# Patient Record
Sex: FEMALE | Race: BLACK OR AFRICAN AMERICAN | NOT HISPANIC OR LATINO
[De-identification: names, ages, dates, MRNs, and addresses within clinical notes are randomized per-mention and may not be internally consistent; named-entity substitution may affect disease eponyms.]

---

## 2017-08-27 ENCOUNTER — HOSPITAL (OUTPATIENT)
Dept: OTHER | Age: 22
End: 2017-08-27
Attending: EMERGENCY MEDICINE

## 2017-10-01 ENCOUNTER — HEALTH MAINTENANCE LETTER (OUTPATIENT)
Age: 22
End: 2017-10-01

## 2017-10-12 ENCOUNTER — HOSPITAL (OUTPATIENT)
Dept: OTHER | Age: 22
End: 2017-10-12
Attending: EMERGENCY MEDICINE

## 2017-10-12 LAB
ALBUMIN SERPL-MCNC: 3.7 GM/DL (ref 3.6–5.1)
ALP SERPL-CCNC: 79 UNIT/L (ref 45–117)
ALT SERPL-CCNC: 23 UNIT/L
ANALYZER ANC (IANC): NORMAL
ANION GAP SERPL CALC-SCNC: 16 MMOL/L (ref 10–20)
AST SERPL-CCNC: 21 UNIT/L
BASOPHILS # BLD: 0 THOUSAND/MCL (ref 0–0.3)
BASOPHILS NFR BLD: 1 %
BILIRUB CONJ SERPL-MCNC: 0.1 MG/DL (ref 0–0.2)
BILIRUB SERPL-MCNC: 0.7 MG/DL (ref 0.2–1)
BUN SERPL-MCNC: 11 MG/DL (ref 6–20)
BUN/CREAT SERPL: 13 (ref 7–25)
CALCIUM SERPL-MCNC: 9.4 MG/DL (ref 8.4–10.2)
CHLORIDE: 105 MMOL/L (ref 98–107)
CO2 SERPL-SCNC: 25 MMOL/L (ref 21–32)
CREAT SERPL-MCNC: 0.88 MG/DL (ref 0.51–0.95)
DIFFERENTIAL METHOD BLD: NORMAL
EOSINOPHIL # BLD: 0.3 THOUSAND/MCL (ref 0.1–0.5)
EOSINOPHIL NFR BLD: 6 %
ERYTHROCYTE [DISTWIDTH] IN BLOOD: 11.9 % (ref 11–15)
GLUCOSE SERPL-MCNC: 115 MG/DL (ref 65–99)
HCG SERPL QL: NEGATIVE
HEMATOCRIT: 40.6 % (ref 36–46.5)
HGB BLD-MCNC: 14.3 GM/DL (ref 12–15.5)
LIPASE SERPL-CCNC: 148 UNIT/L (ref 73–393)
LYMPHOCYTES # BLD: 1.4 THOUSAND/MCL (ref 1–4.8)
LYMPHOCYTES NFR BLD: 23 %
MCH RBC QN AUTO: 31.6 PG (ref 26–34)
MCHC RBC AUTO-ENTMCNC: 35.2 GM/DL (ref 32–36.5)
MCV RBC AUTO: 89.8 FL (ref 78–100)
MONOCYTES # BLD: 0.7 THOUSAND/MCL (ref 0.3–0.9)
MONOCYTES NFR BLD: 11 %
NEUTROPHILS # BLD: 3.7 THOUSAND/MCL (ref 1.8–7.7)
NEUTROPHILS NFR BLD: 59 %
NEUTS SEG NFR BLD: NORMAL %
PERCENT NRBC: NORMAL
PLATELET # BLD: 224 THOUSAND/MCL (ref 140–450)
POTASSIUM SERPL-SCNC: 3.6 MMOL/L (ref 3.4–5.1)
PROT SERPL-MCNC: 7.7 GM/DL (ref 6.4–8.2)
RBC # BLD: 4.52 MILLION/MCL (ref 4–5.2)
SODIUM SERPL-SCNC: 142 MMOL/L (ref 135–145)
WBC # BLD: 6.2 THOUSAND/MCL (ref 4.2–11)

## 2021-01-03 ENCOUNTER — TELEPHONE (OUTPATIENT)
Dept: BEHAVIORAL HEALTH | Facility: CLINIC | Age: 26
End: 2021-01-03

## 2021-01-03 ENCOUNTER — COMMUNICATION - HEALTHEAST (OUTPATIENT)
Dept: CARE COORDINATION | Facility: HOSPITAL | Age: 26
End: 2021-01-03

## 2021-01-03 ENCOUNTER — HOSPITAL ENCOUNTER (EMERGENCY)
Facility: CLINIC | Age: 26
Discharge: NURSING, REHAB OR LONG-TERM CARE | End: 2021-01-03
Attending: EMERGENCY MEDICINE | Admitting: EMERGENCY MEDICINE

## 2021-01-03 VITALS
BODY MASS INDEX: 45.36 KG/M2 | TEMPERATURE: 97.4 F | DIASTOLIC BLOOD PRESSURE: 76 MMHG | HEART RATE: 69 BPM | SYSTOLIC BLOOD PRESSURE: 125 MMHG | HEIGHT: 67 IN | WEIGHT: 289 LBS | OXYGEN SATURATION: 99 % | RESPIRATION RATE: 14 BRPM

## 2021-01-03 DIAGNOSIS — F32.A DEPRESSION, UNSPECIFIED DEPRESSION TYPE: ICD-10-CM

## 2021-01-03 DIAGNOSIS — R45.851 SUICIDAL THOUGHTS: ICD-10-CM

## 2021-01-03 LAB
AMPHETAMINES UR QL SCN: NEGATIVE
BARBITURATES UR QL: NEGATIVE
BENZODIAZ UR QL: NEGATIVE
CANNABINOIDS UR QL SCN: POSITIVE
COCAINE UR QL: NEGATIVE
HCG UR QL: NEGATIVE
LABORATORY COMMENT REPORT: NORMAL
OPIATES UR QL SCN: NEGATIVE
PCP UR QL SCN: NEGATIVE
SARS-COV-2 RNA SPEC QL NAA+PROBE: NEGATIVE
SPECIMEN SOURCE: NORMAL

## 2021-01-03 PROCEDURE — C9803 HOPD COVID-19 SPEC COLLECT: HCPCS

## 2021-01-03 PROCEDURE — 81025 URINE PREGNANCY TEST: CPT | Performed by: EMERGENCY MEDICINE

## 2021-01-03 PROCEDURE — 80307 DRUG TEST PRSMV CHEM ANLYZR: CPT | Performed by: EMERGENCY MEDICINE

## 2021-01-03 PROCEDURE — 90791 PSYCH DIAGNOSTIC EVALUATION: CPT

## 2021-01-03 PROCEDURE — 87635 SARS-COV-2 COVID-19 AMP PRB: CPT | Performed by: EMERGENCY MEDICINE

## 2021-01-03 PROCEDURE — 99285 EMERGENCY DEPT VISIT HI MDM: CPT | Mod: 25

## 2021-01-03 ASSESSMENT — ENCOUNTER SYMPTOMS
NERVOUS/ANXIOUS: 1
COUGH: 0
CHILLS: 0
FEVER: 0

## 2021-01-03 ASSESSMENT — MIFFLIN-ST. JEOR: SCORE: 2088.53
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
## 2021-01-03 NOTE — ED PROVIDER NOTES
"  History   Chief Complaint:  Suicidal     The history is provided by the patient.      Lidya Macedo is a 25 year old female who presents with suicidal ideations. The patients tells us that she cut herself this evening and is feeling helpless. She notes that this is not the first time she has tried to harm herself, last year she took a bunch of pills in an attempt to kill herself and she didn't want to get to that point which prompted her ED visit. She tells us that she has never had professional help before and that her mother and sister are the ones who help her through situations like this. She also tells us that she is currently sober and that she does not have any issues with drugs or alcohol. Patient denies cough, cold fever or chills.     Review of Systems   Constitutional: Negative for chills and fever.   Respiratory: Negative for cough.    Psychiatric/Behavioral: Positive for self-injury and suicidal ideas. The patient is nervous/anxious.    All other systems reviewed and are negative.    Allergies:  The patient has no known allergies.     Medications:  The patient is not currently taking any prescribed medications.    Past Medical History:     The patient is unable to provide past medical history secondary to current state.      Past Surgical History:    The patient denies past surgical history.      Family History:    The patient denies past family history.     Social History:  The patient has a mom and sister who help her work through things in life.  The patient has not seen a therapist about any of her struggles.     Physical Exam     Patient Vitals for the past 24 hrs:   BP Temp Temp src Pulse Resp SpO2 Height Weight   01/03/21 0334 -- -- -- -- -- 99 % -- --   01/03/21 0316 (!) 145/89 97.4  F (36.3  C) Temporal 110 18 -- 1.702 m (5' 7\") 131.1 kg (289 lb)       Physical Exam    Constitutional:  Cooperative. Shaking. Tearful. Looks sad and uncomfortable.   HENT:   Head:    Atraumatic. "   Mouth/Throat:   Oropharynx is without erythema or exudate and mucous membranes are moist.   Eyes:    Conjunctivae normal and EOM are normal.      Pupils are equal, round, and reactive to light.   Neck:    Normal range of motion. Neck supple.   Cardiovascular:  Normal rate, regular rhythm, normal heart sounds and radial and dorsalis pedis pulses are 2+ and symmetric.    Pulmonary/Chest:  Effort normal and breath sounds normal.   Abdominal:   Soft. Bowel sounds are normal.      No splenomegaly or hepatomegaly. No tenderness. No rebound.   Musculoskeletal:  Normal range of motion. No edema and no tenderness.   Neurological:  Alert. Normal strength. No cranial nerve deficit.  Skin:    Skin is warm and dry.   Psychiatric:   Crying, depressed mood. Flat affect. Limited eye contact. Normal speech, well groomed.    Emergency Department Course     Emergency Department Course:    Reviewed:  I reviewed nursing notes, vitals, past medical history and care everywhere    Assessments:  0315 I performed my initial examination on the patient at this time, see examination note above.     0710 I rechecked the patient. Explained findings to patient.    Consults:  0540 DEC spoke with the patient.     0720 I contacted DEC, who I updated on the findings.    Disposition:  Boarding.     Impression & Plan     Medical Decision Making:    Patient presents reporting situational stressors and worsening depression.  She has been having suicidal thoughts for the last few days.  Tonight she made several superficial cuts on her right arm.  She reported a past suicide attempt by overdose, and did not want to get to that point again.  She denies having ingested anything to try to harm herself.  She reports she is sober.  She denies recent illness or injury.    On exam she is tearful, with depressed mood and flat affect.  She does not make good eye contact.  The superficial abrasions on her right forearm do not require repair.    She met with the  mental health , and discussed options for inpatient versus outpatient therapy.  Initially the patient reported she wanted outpatient treatment, but when I went to discuss discharge with the patient, she is unable to contract for safety.  She remains tearful and distraught.  She has me to inform her boyfriend that she was in the hospital, and I called him.    An inpatient mental health bed is being arranged.  Because there is a COVID-19 pandemic in a asymptomatic Covid swab will be obtained.  Drug screen and pregnancy test are also pending.  I signed her out to my partner at 7:40 AM, awaiting mental health bed.    Patient is agreeable to this plan.    Diagnosis:    ICD-10-CM    1. Depression, unspecified depression type  F32.9    2. Suicidal thoughts  R45.851    3. Self-inflicted injury  Z72.89          Scribe Disclosure:  IKalen, am serving as a scribe at 3:15 AM on 1/3/2021 to document services personally performed by Prudencio Ma MD, based on my observations and the provider's statements to me.     Scribe Disclosure:  I, London Meyer, am serving as a scribe at 7:28 AM on 1/3/2021 to document services personally performed by Prudencio Ma MD based on my observations and the provider's statements to me.         Prudencio Ma MD  01/03/21 0741
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
## 2021-01-03 NOTE — ED NOTES
"Writer answered staff land line recognizing the phone number to be that of Lidya's boyfriend \"Leighton\" as given to writer in report from Caitlin HADDAD.  When writer answered the phone the caller asked to talk with Lidya.  Writer asked the caller's relationship and he stated \"Her brother\".  Writer knowing this to not be true writer asked the caller once again to clarify his relationship with Lidya and he again stated \"Her brother\"  At this point writer communicated to \"Leighton\" that Caitlin HADDAD had spoke with him earlier and at that time Leighton stated he is Lidya's boyfriend.  Leighton made no comment.  Writer stated that the  \"Eleanor\" here at [facility] had attempted to call Leighton and he did not answer the phone.  Leighton with a pressured voice made demands to \"talk with Lidya, Is she OK? I want to talk with her now\"  Writer redirected Leighton's conversation stating that the  would like to speak with him directly and attempted to give Leighton the  direct phone number.  Leighton interrupted writer was again making demands to speak with Lidya stating in a pressured tone of voice, \"this is a big fucked up deal\"  Writer again attempted to give Leighton the  direct line.  Leighton listened and then hung up the phone.     "

## 2021-01-03 NOTE — TELEPHONE ENCOUNTER
R: left vm for Oscar at 9:55 am asking for a call back. mbw  Author called #77 and left a msg asking for RN to call back. mbw  Author called #77 charge RN to ask about cohorting. He said they are unable to do so today. nikki  Author called Gerber at [facility] at 11:19 am and he said they are at cap currently but may have availability tomor. nikki  Author called Cleopatra at [facility] who requested a CMP, CBC and urine results if pt admits to [facility]. nikki  Author called [facility] at 11:31 am and left a vm asking for a call back. nikki  Author called [facility] ED and left a msg asking for a copy of the hold and asking for ED MD to call back. nikki  #4500/[facility] accepted for [facility] (lowest census) and agreed to accept pt as overflow on 4500 (pt not psychotic); unit informed at 11:40 am, er informed at 11:49 am; author faxed clinical and hold paperwork to 4500. nikki

## 2021-01-03 NOTE — TELEPHONE ENCOUNTER
S:  ARAM Duenas called at 7:48am with 25y/F in [facility] ED with SIB and SI.    B:  Pt presents tot he ED with self inflicted wounds from cutting, endorsing SI and unable to contract for safety.  Pt has hx of depression.  Pt has no insight, reports feeling overwhelmed and experiencing emotional distress that is contributing to current psychological angst.  Pt is isolative as she recently moved from [place] and is new to the [facility].   Pt has no social supports.  Pt has a past suicide attempt a year ago by overdose.  No AH or VH.  Apt denies HI.  Pt calm and cooperative.      A:  VOL    R: Patient cleared and ready for behavioral bed placement: Yes   Pt placed on worklist pending bed availability

## 2021-01-03 NOTE — ED NOTES
Provider at bedside, patient refusing to end phone conversation with her boyfriend to speak with physician.  Patient is requesting that physician speak with her boyfriend on the phone.  Florentinofrienross states that the patients children are ages 3 and 6 and currently unsupervised, however he refuses to give information in regards to the location of these children.   on call paged.      Florentinotania Garcial can be reached at the [facility]:  [phone]  Room 113

## 2021-01-03 NOTE — ED NOTES
"Pt standing in the doorway of her room rocking back and forth looking at the ground.  Pt stated \"I want to go home\"  Writer again clarified that pt will be transferred to [facility] and that he hold will be lifted by the psychiatrist in 72 hours with inpatient participation as well as outpatient plan.  Pt offered hot breakfast or lunch and she declined.  Pt sat on the edge of her bed looking down at the floor with shoulders slumped   "

## 2021-01-03 NOTE — ED NOTES
72 hour hold and copy of patient rights faxed too [facility] intake at their request.  Pt given a copy

## 2021-01-03 NOTE — ED NOTES
Pt resting, repositions independently.  Remains on a 72 hour hold with staff and security watch with SW in process of plan.

## 2021-01-03 NOTE — ED PROVIDER NOTES
"I was contacted by nursing as the patient requested to leave the emergency department.  The patient was previously here on voluntary status as she wanted admission and was unable to contract for safety when Dr. Ma spoke to her this morning.  Per nursing she had been on the phone with her boyfriend when she requested to leave.  While I was speaking to the patient she would not get off the phone with her boyfriend even when specifically asked she placed the phone in her lap.  When I discussed that we must be able to have a conversation privately without anyone on the phone.  I asked her to call me to reassess when she was able to get off the phone.  She called nursing in and reported that her boyfriend wanted to speak to me and provided his name and room number.  I spoke to him and he requested she be discharged because \"she needs to work to make money\" and then because \"her kids are home alone\"  I asked how old the children are and was told \"3 and 6.\"  I then verified that the children age 3 and 6 were home alone and he reported that they are.  I stated that we will have social work help us figure out care for the children while the patient is hospitalized and requested to address.  He did not provide me an address and stated that \"the children would only open the door for their mother\" and that he was \"not going to give me an address if I was not going to discharge her\"    He told me several times that I needed to discharge the patient when I informed him that she would be here at least until she is assessed by psychiatry and for potentially 3 to 4 days.    I immediately contacted  some concern for the safety of the children.  She is coming to the emergency department to talk to the patient.    MD Ofelia Wong Karah M, MD  01/03/21 1014    "

## 2021-01-03 NOTE — ED NOTES
Pt resting, repositions independently.  Every time pt repositions she keeps the blanket over her head.  Remains on a 72 hour hold with staff and security watch

## 2021-01-03 NOTE — ED NOTES
"Patient's boyfriend calling, asking to speak with the patient states is regarding \"The kid situation\".  Patient does not wish to speak with him at this time. Boyfriend continues to call and states \"can you wake her up, I don't want to leave the kids without her consent\".    "

## 2021-01-03 NOTE — ED AVS SNAPSHOT
[facility] Emergency Dept  [address] [address]  Phone: [phone]  Fax: [phone]                                    Lidya Macedo   MRN: [id number]    Department: [facility] Emergency Dept   Date of Visit: 1/3/2021           After Visit Summary Signature Page    I have received my discharge instructions, and my questions have been answered. I have discussed any challenges I see with this plan with the nurse or doctor.    ..........................................................................................................................................  Patient/Patient Representative Signature      ..........................................................................................................................................  Patient Representative Print Name and Relationship to Patient    ..................................................               ................................................  Date                                   Time    ..........................................................................................................................................  Reviewed by Signature/Title    ...................................................              ..............................................  Date                                               Time          22EPIC Rev 08/18

## 2021-01-03 NOTE — ED TRIAGE NOTES
Patient arrived in ED appearing upset, nervous, and tearful, stating that she is having thoughts of suicide and cutting herself.

## 2021-01-03 NOTE — SAFE
Lidya Macedo  January 3, 2021    Lidya is deemed to be at moderate risk for suicide. She has limited social supports and is isolated and new to the area. She currently does not have enough protective factors to engage to keep her safe. She has self inflicted wounds and is experiencing emotional distress that is contributing to current psychological angst. She is not able to return home due to low emotional tolerance and low coping skills that are effective and healthy.     Risk Factors: Suicidal ideation, relocation, social stressors, emotional distress, untreated mental health, limited social supports, lack of follow through, hesitancy towards compliancy, lack of insight.     Protective Factors: States would be willing to meet with therapist and psychiatrist in order to change and improve.     Psychotherapy Intervention: Crisis assessment, safety plan-co developed, establish of rapport, active listening, Crisis Psychotherapy. Communicated with Lidya about her recent past and events that led to her ED admittance. Addressed issues that contribute towards distress in order to mitigate risk.    Response: Patient was initially wanting to try going to home with scheduled appointments, but was not able to contract for safety and follow through with said safety plan.    Recommendation: It is recommended for Lidya to be placed inpatient due to her overwhelmed state, desire and capability to harm herself and lack of coping skills and protective factors. Lidya will need to be in a safe environment where she receives mental and emotional care to address symptoms that have been exacerbated by her psychological stressors. Recommend to develop a safety plan for return home with support services and community resources that will be beneficial in rehabilitation.      Current Suicidal Ideation/Self-Injurious Concerns/Methods: Cutting    Inappropriate Sexual Behavior: No    Aggression/Homicidal Ideation: None - N/A      For  additional details see full DEC assessment.       Yulissa Byrd

## 2021-01-03 NOTE — ED NOTES
"Writer spoke with \"Kenyatta\" MIKE for update in her progress with obtaining collateral information from the patient's boyfriend.  Kenyatta stated she is in process of collecting information and will be calling writer back.  "

## 2021-01-04 ENCOUNTER — COMMUNICATION - HEALTHEAST (OUTPATIENT)
Dept: SCHEDULING | Facility: CLINIC | Age: 26
End: 2021-01-04

## 2021-06-14 NOTE — TELEPHONE ENCOUNTER
Areli Weems   Coordinator      Telephone Encounter   Signed   Encounter Date:  1/3/2021                    []Hide copied text    []Nerissa for details  S:  ARAM Duenas called at 7:48am with 25y/F in [facility] ED with SIB and SI.     B:  Pt presents tot he ED with self inflicted wounds from cutting, endorsing SI and unable to contract for safety.  Pt has hx of depression.  Pt has no insight, reports feeling overwhelmed and experiencing emotional distress that is contributing to current psychological angst.  Pt is isolative as she recently moved from [place] and is new to the [facility].   Pt has no social supports.  Pt has a past suicide attempt a year ago by overdose.  No AH or VH.  Apt denies HI.  Pt calm and cooperative.       A:  VOL     R: Patient cleared and ready for behavioral bed placement: Yes   Pt placed on worklist pending bed availability                         R: left vm for Oscar at 9:55 am asking for a call back. mbw  Author called #77 and left a msg asking for RN to call back. mbw  Author called #77 charge RN to ask about cohorting. He said they are unable to do so today. mbw  Author called Gerber at [facility] at 11:19 am and he said they are at cap currently but may have availability tomor. mbw  Author called Cleopatra at [facility] who requested a CMP, CBC and urine results if pt admits to [facility]. mbw  Author called [facility] at 11:31 am and left a vm asking for a call back. mbw  Author called [facility] ED and left a msg asking for a copy of the hold and asking for ED MD to call back. nikki  #4500/[facility] accepted for [facility] (lowest census) and agreed to accept pt as overflow on 4500 (pt not psychotic); unit informed at 11:40 am, er informed at 11:49 am; author faxed clinical and hold paperwork to 4500. nikki

## 2021-06-16 PROBLEM — F33.1 DEPRESSION, MAJOR, RECURRENT, MODERATE (H): Status: ACTIVE | Noted: 2021-01-03
